# Patient Record
Sex: FEMALE | Race: WHITE | Employment: OTHER | URBAN - METROPOLITAN AREA
[De-identification: names, ages, dates, MRNs, and addresses within clinical notes are randomized per-mention and may not be internally consistent; named-entity substitution may affect disease eponyms.]

---

## 2020-02-17 ENCOUNTER — NURSE TRIAGE (OUTPATIENT)
Dept: NURSING | Facility: CLINIC | Age: 70
End: 2020-02-17

## 2020-02-17 NOTE — TELEPHONE ENCOUNTER
"Daughter calling; patient present.  [facility] patient was prescribed nitrofurantoin by a doctor in [place] for a bladder infection.  She has taken it for a couple of days and she is complaining of kidney pain and chills.  [facility] patient doesn't have a PCP and asking if she should take patient to ED.  FNA advised patient should be seen and could contact provider who prescribed nitrofurantoin or UC would be an option.  Daughter states they are currently in [place] and \"they are kind of backward here\" so will drive to [place].                  "

## 2020-04-14 ENCOUNTER — TELEPHONE (OUTPATIENT)
Dept: SURGERY | Facility: CLINIC | Age: 70
End: 2020-04-14

## 2020-04-14 NOTE — TELEPHONE ENCOUNTER
Name of caller: Daughter Brittney     Reason for Call:  Patient was at ER 2 months ago had a CT scan they told her she was full of BM. She wants to know what else they can do, she's been drinking prune juice. She didn't want to call her primary Dr because they don't know what they are doing there.     Surgeon:  Dr. Sethi    Recent Surgery:  No, 2013    If yes, when & what type:  INCISIONAL HERNIA REPAIR WITH MESH, POSSIBLE LAPAROSOCPY   GENERAL   PT INST TO HAVE H&P WITH DR. VARGAS   11/12/13       Best phone number to reach pt at is: [phone]  Ok to leave a message with medical info? Yes.    Pharmacy preferred (if calling for a refill): N/A

## 2020-04-15 ENCOUNTER — HOSPITAL ENCOUNTER (EMERGENCY)
Facility: CLINIC | Age: 70
Discharge: HOME | End: 2020-04-15
Attending: EMERGENCY MEDICINE | Admitting: EMERGENCY MEDICINE
Payer: MEDICARE

## 2020-04-15 ENCOUNTER — APPOINTMENT (OUTPATIENT)
Dept: CT IMAGING | Facility: CLINIC | Age: 70
End: 2020-04-15
Attending: EMERGENCY MEDICINE
Payer: MEDICARE

## 2020-04-15 VITALS
HEART RATE: 77 BPM | BODY MASS INDEX: 29.26 KG/M2 | RESPIRATION RATE: 18 BRPM | TEMPERATURE: 98.4 F | SYSTOLIC BLOOD PRESSURE: 146 MMHG | DIASTOLIC BLOOD PRESSURE: 78 MMHG | WEIGHT: 160 LBS | OXYGEN SATURATION: 100 %

## 2020-04-15 DIAGNOSIS — R19.4 CHANGE IN STOOL HABITS: ICD-10-CM

## 2020-04-15 DIAGNOSIS — R10.9 NONSPECIFIC ABDOMINAL PAIN: ICD-10-CM

## 2020-04-15 LAB
ALBUMIN SERPL-MCNC: 3.5 G/DL (ref 3.4–5)
ALBUMIN UR-MCNC: NEGATIVE MG/DL
ALP SERPL-CCNC: 127 U/L (ref 40–150)
ALT SERPL W P-5'-P-CCNC: 26 U/L (ref 0–50)
ANION GAP SERPL CALCULATED.3IONS-SCNC: 7 MMOL/L (ref 3–14)
APPEARANCE UR: CLEAR
AST SERPL W P-5'-P-CCNC: 23 U/L (ref 0–45)
BASOPHILS # BLD AUTO: 0 10E9/L (ref 0–0.2)
BASOPHILS NFR BLD AUTO: 0.4 %
BILIRUB SERPL-MCNC: 0.4 MG/DL (ref 0.2–1.3)
BILIRUB UR QL STRIP: NEGATIVE
BUN SERPL-MCNC: 10 MG/DL (ref 7–30)
CALCIUM SERPL-MCNC: 8.8 MG/DL (ref 8.5–10.1)
CHLORIDE SERPL-SCNC: 106 MMOL/L (ref 94–109)
CO2 SERPL-SCNC: 25 MMOL/L (ref 20–32)
COLOR UR AUTO: YELLOW
CREAT BLD-MCNC: 0.9 MG/DL (ref 0.52–1.04)
CREAT SERPL-MCNC: 0.85 MG/DL (ref 0.52–1.04)
DIFFERENTIAL METHOD BLD: NORMAL
EOSINOPHIL # BLD AUTO: 0.1 10E9/L (ref 0–0.7)
EOSINOPHIL NFR BLD AUTO: 1 %
ERYTHROCYTE [DISTWIDTH] IN BLOOD BY AUTOMATED COUNT: 14 % (ref 10–15)
GFR SERPL CREATININE-BSD FRML MDRD: 62 ML/MIN/{1.73_M2}
GFR SERPL CREATININE-BSD FRML MDRD: 70 ML/MIN/{1.73_M2}
GLUCOSE SERPL-MCNC: 102 MG/DL (ref 70–99)
GLUCOSE UR STRIP-MCNC: NEGATIVE MG/DL
HCT VFR BLD AUTO: 44.9 % (ref 35–47)
HGB BLD-MCNC: 14.5 G/DL (ref 11.7–15.7)
HGB UR QL STRIP: ABNORMAL
IMM GRANULOCYTES # BLD: 0.1 10E9/L (ref 0–0.4)
IMM GRANULOCYTES NFR BLD: 0.6 %
KETONES UR STRIP-MCNC: NEGATIVE MG/DL
LACTATE BLD-SCNC: 1.7 MMOL/L (ref 0.7–2)
LEUKOCYTE ESTERASE UR QL STRIP: NEGATIVE
LIPASE SERPL-CCNC: 94 U/L (ref 73–393)
LYMPHOCYTES # BLD AUTO: 3.1 10E9/L (ref 0.8–5.3)
LYMPHOCYTES NFR BLD AUTO: 29.6 %
MCH RBC QN AUTO: 30.5 PG (ref 26.5–33)
MCHC RBC AUTO-ENTMCNC: 32.3 G/DL (ref 31.5–36.5)
MCV RBC AUTO: 94 FL (ref 78–100)
MONOCYTES # BLD AUTO: 0.6 10E9/L (ref 0–1.3)
MONOCYTES NFR BLD AUTO: 6.2 %
MUCOUS THREADS #/AREA URNS LPF: PRESENT /LPF
NEUTROPHILS # BLD AUTO: 6.4 10E9/L (ref 1.6–8.3)
NEUTROPHILS NFR BLD AUTO: 62.2 %
NITRATE UR QL: NEGATIVE
NRBC # BLD AUTO: 0 10*3/UL
NRBC BLD AUTO-RTO: 0 /100
PH UR STRIP: 6 PH (ref 5–7)
PLATELET # BLD AUTO: 228 10E9/L (ref 150–450)
POTASSIUM SERPL-SCNC: 4 MMOL/L (ref 3.4–5.3)
PROT SERPL-MCNC: 7.8 G/DL (ref 6.8–8.8)
RBC # BLD AUTO: 4.76 10E12/L (ref 3.8–5.2)
RBC #/AREA URNS AUTO: 9 /HPF (ref 0–2)
SODIUM SERPL-SCNC: 138 MMOL/L (ref 133–144)
SOURCE: ABNORMAL
SP GR UR STRIP: 1.02 (ref 1–1.03)
SQUAMOUS #/AREA URNS AUTO: 1 /HPF (ref 0–1)
TROPONIN I SERPL-MCNC: <0.015 UG/L (ref 0–0.04)
UROBILINOGEN UR STRIP-MCNC: NORMAL MG/DL (ref 0–2)
WBC # BLD AUTO: 10.3 10E9/L (ref 4–11)
WBC #/AREA URNS AUTO: 2 /HPF (ref 0–5)

## 2020-04-15 PROCEDURE — 25000132 ZZH RX MED GY IP 250 OP 250 PS 637: Mod: GY | Performed by: EMERGENCY MEDICINE

## 2020-04-15 PROCEDURE — 85025 COMPLETE CBC W/AUTO DIFF WBC: CPT | Performed by: EMERGENCY MEDICINE

## 2020-04-15 PROCEDURE — 82565 ASSAY OF CREATININE: CPT

## 2020-04-15 PROCEDURE — 96374 THER/PROPH/DIAG INJ IV PUSH: CPT | Mod: 59

## 2020-04-15 PROCEDURE — 83690 ASSAY OF LIPASE: CPT | Performed by: EMERGENCY MEDICINE

## 2020-04-15 PROCEDURE — 96376 TX/PRO/DX INJ SAME DRUG ADON: CPT

## 2020-04-15 PROCEDURE — 25800030 ZZH RX IP 258 OP 636: Performed by: EMERGENCY MEDICINE

## 2020-04-15 PROCEDURE — 84484 ASSAY OF TROPONIN QUANT: CPT | Performed by: EMERGENCY MEDICINE

## 2020-04-15 PROCEDURE — 81001 URINALYSIS AUTO W/SCOPE: CPT | Performed by: EMERGENCY MEDICINE

## 2020-04-15 PROCEDURE — 96361 HYDRATE IV INFUSION ADD-ON: CPT

## 2020-04-15 PROCEDURE — 96375 TX/PRO/DX INJ NEW DRUG ADDON: CPT

## 2020-04-15 PROCEDURE — 74177 CT ABD & PELVIS W/CONTRAST: CPT

## 2020-04-15 PROCEDURE — 99285 EMERGENCY DEPT VISIT HI MDM: CPT | Mod: 25

## 2020-04-15 PROCEDURE — 25000128 H RX IP 250 OP 636: Performed by: EMERGENCY MEDICINE

## 2020-04-15 PROCEDURE — 25000125 ZZHC RX 250: Performed by: EMERGENCY MEDICINE

## 2020-04-15 PROCEDURE — 83605 ASSAY OF LACTIC ACID: CPT | Performed by: EMERGENCY MEDICINE

## 2020-04-15 PROCEDURE — 80053 COMPREHEN METABOLIC PANEL: CPT | Performed by: EMERGENCY MEDICINE

## 2020-04-15 PROCEDURE — 93005 ELECTROCARDIOGRAM TRACING: CPT

## 2020-04-15 RX ORDER — MORPHINE SULFATE 4 MG/ML
4 INJECTION, SOLUTION INTRAMUSCULAR; INTRAVENOUS
Status: DISCONTINUED | OUTPATIENT
Start: 2020-04-15 | End: 2020-04-15 | Stop reason: HOSPADM

## 2020-04-15 RX ORDER — IOPAMIDOL 755 MG/ML
81 INJECTION, SOLUTION INTRAVASCULAR ONCE
Status: COMPLETED | OUTPATIENT
Start: 2020-04-15 | End: 2020-04-15

## 2020-04-15 RX ORDER — TIZANIDINE HYDROCHLORIDE 2 MG/1
2 CAPSULE, GELATIN COATED ORAL 3 TIMES DAILY
COMMUNITY
End: 2020-11-23

## 2020-04-15 RX ORDER — ONDANSETRON 4 MG/1
4 TABLET, ORALLY DISINTEGRATING ORAL EVERY 8 HOURS PRN
Qty: 10 TABLET | Refills: 0 | Status: SHIPPED | OUTPATIENT
Start: 2020-04-15 | End: 2020-04-18

## 2020-04-15 RX ORDER — HYDROCODONE BITARTRATE AND ACETAMINOPHEN 5; 325 MG/1; MG/1
1 TABLET ORAL EVERY 6 HOURS PRN
Qty: 10 TABLET | Refills: 0 | Status: SHIPPED | OUTPATIENT
Start: 2020-04-15 | End: 2020-04-18

## 2020-04-15 RX ORDER — ONDANSETRON 2 MG/ML
4 INJECTION INTRAMUSCULAR; INTRAVENOUS
Status: COMPLETED | OUTPATIENT
Start: 2020-04-15 | End: 2020-04-15

## 2020-04-15 RX ORDER — FAMOTIDINE 20 MG/1
20 TABLET, FILM COATED ORAL ONCE
Status: COMPLETED | OUTPATIENT
Start: 2020-04-15 | End: 2020-04-15

## 2020-04-15 RX ORDER — MORPHINE SULFATE 4 MG/ML
4 INJECTION, SOLUTION INTRAMUSCULAR; INTRAVENOUS ONCE
Status: COMPLETED | OUTPATIENT
Start: 2020-04-15 | End: 2020-04-15

## 2020-04-15 RX ORDER — ACETAMINOPHEN 500 MG
1000 TABLET ORAL ONCE
Status: DISCONTINUED | OUTPATIENT
Start: 2020-04-15 | End: 2020-04-15

## 2020-04-15 RX ORDER — FAMOTIDINE 20 MG/1
20 TABLET, FILM COATED ORAL 2 TIMES DAILY
Qty: 30 TABLET | Refills: 0 | Status: SHIPPED | OUTPATIENT
Start: 2020-04-15 | End: 2020-11-23

## 2020-04-15 RX ADMIN — ONDANSETRON 4 MG: 2 INJECTION INTRAMUSCULAR; INTRAVENOUS at 16:21

## 2020-04-15 RX ADMIN — IOPAMIDOL 81 ML: 755 INJECTION, SOLUTION INTRAVENOUS at 16:33

## 2020-04-15 RX ADMIN — LIDOCAINE HYDROCHLORIDE 30 ML: 20 SOLUTION ORAL; TOPICAL at 17:27

## 2020-04-15 RX ADMIN — MORPHINE SULFATE 4 MG: 4 INJECTION INTRAVENOUS at 16:22

## 2020-04-15 RX ADMIN — MORPHINE SULFATE 4 MG: 4 INJECTION INTRAVENOUS at 17:07

## 2020-04-15 RX ADMIN — SODIUM CHLORIDE 500 ML: 9 INJECTION, SOLUTION INTRAVENOUS at 16:22

## 2020-04-15 RX ADMIN — FAMOTIDINE 20 MG: 20 TABLET ORAL at 17:29

## 2020-04-15 ASSESSMENT — ENCOUNTER SYMPTOMS
DYSURIA: 0
DIARRHEA: 1
VOMITING: 1
ABDOMINAL PAIN: 1
COUGH: 0
CHILLS: 1
SORE THROAT: 0
FEVER: 0
DIFFICULTY URINATING: 0
NAUSEA: 1
APPETITE CHANGE: 1
BLOOD IN STOOL: 0

## 2020-04-15 NOTE — ED TRIAGE NOTES
Pt here with generalized abd and bilateral flank pain x1 wk. No cough, fever, diarrhea, SOB. Emesis x1 yesterday. No dysuria. ABC intact. A&Ox4.

## 2020-04-15 NOTE — TELEPHONE ENCOUNTER
Called patient this morning to advised that she call her PCP.  She states that she spoke with PCP this morning and she is scheduled for a CT scan today.

## 2020-04-15 NOTE — ED PROVIDER NOTES
"  History     Chief Complaint:  Abdominal Pain      HPI   Paula Simms is a 70 year old female who presents with diffuse abdominal and flank pain. Per the patient, last month she presented to an out of town emergency room for abdominal pain, and she was told she was \"full of stool\" despite no report of constipation. She was told to get prune juice and her pain seemed to clear up a bit. Seven days ago, she developed similar abdominal pain which is all over her abdomen and radiates to her flanks. In the morning she has been having formed stools without blood but it develops into watery diarrhea later in the day since starting prune juice again. She also developed nausea yesterday and vomited once without blood. She can drink fluids but feels bloated afterwards and has some appetite loss. Eating exacerbates her symptoms. She also endorses chills at times but no fever. Yesterday, she called Dr. Sethi of surgery for previously repaired hernias but a nurse told her that those are likely not related. She notes the pain persisted so she came to the ED. She denies fever, cough, congestion, sore throat, difficulty urinating, dysuria, sore throat, leg pain or swelling,and alcohol use.    Allergies:  Levofloxacin  Sulfa Drugs    Medications:    Tizanidine    Past Medical History:    CAD  Diverticulitis  GERD  Heart attack  Heart disease  Stented coronary artery  SBO  Incisional hernia  Hypertension  Arthritis  Hyperlipidemia    Past Surgical History:    Appendectomy  Cholecystectomy  Esophageal stretching x2  Herniorrhaphy incisional  Colectomy left (descending)  Resect small bowel without ostomy  Breast augmentation  Removal of breast augmentation    Family History:    Father: CAD  Mother: Uterine cancer, thyroid disorder  Sister: Thyroid disorder    Social History:  Smoking Status: Current Every Day  Smokeless Tobacco: Never Used  Alcohol Use: Negative  Drug Use: Negative  PCP: Sabino Miles  Marital Status:  "     Review of Systems   Constitutional: Positive for appetite change and chills. Negative for fever.   HENT: Negative for congestion and sore throat.    Respiratory: Negative for cough.    Cardiovascular: Negative for leg swelling.   Gastrointestinal: Positive for abdominal pain, diarrhea, nausea and vomiting. Negative for blood in stool.   Genitourinary: Negative for difficulty urinating and dysuria.   All other systems reviewed and are negative.      Physical Exam     Patient Vitals for the past 24 hrs:   BP Temp Temp src Pulse Heart Rate Resp SpO2 Weight   04/15/20 1700 126/68 -- -- 76 -- -- 99 % --   04/15/20 1615 (!) 143/80 -- -- 79 -- -- 99 % --   04/15/20 1600 (!) 141/78 -- -- 79 -- -- 97 % --   04/15/20 1525 (!) 156/83 98.4  F (36.9  C) Oral -- 87 16 98 % 72.6 kg (160 lb)       Physical Exam  General: Elderly female sitting upright  Eyes: PERRL, Conjunctive within normal limits. No scleral icterus.  ENT: Moist mucous membranes, oropharynx clear.   CV: Normal S1S2, no murmur, rub or gallop. Regular rate and rhythm  Resp: Clear to auscultation bilaterally, no wheezes, rales or rhonchi. Normal respiratory effort.  GI: Abdomen is soft and mildly protuberant. Tender diffusely in the lower quadrants and tender in the epigastrium. No palpable masses. No rebound or guarding. Midline scar noted.  MSK: Trace edema. Nontender. Normal active range of motion.  Skin: Warm and dry. No rashes or lesions or ecchymoses on visible skin.  Neuro: Alert and oriented. Responds appropriately to all questions and commands. No focal findings appreciated. Normal muscle tone.  Psych: Normal mood and affect. Pleasant.      Emergency Department Course     ECG:  ECG taken at 1735, ECG read at 1737  Sinus rhythm with premature atrial complexes  Otherwise normal ECG  Rate 87 bpm. DC interval 164 ms. QRS duration 72 ms. QT/QTc 386/464 ms. P-R-T axes 72 23 67.    Imaging:  Radiology findings were communicated with the patient who  voiced understanding of the findings.    CT Abdomen Pelvis w Contrast  1.  No acute changes noted in the abdomen or pelvis to account for  patient's symptoms. No bowel obstruction or diverticulitis. Apparent  prior appendectomy changes.  2.  Questionable mild gastric antral wall thickening without  surrounding inflammation. Gastritis is possible. Correlate with  patient's symptoms.  3.  Mildly prominent yony hepatis lymph nodes without surrounding  inflammation. Pancreas is unremarkable. These are indeterminate.  Reading per radiology.    Laboratory:  Laboratory findings were communicated with the patient who voiced understanding of the findings.    CBC: WBC 10.3, HGB 14.5,   BMP: Glucose 102(H) o/w WNL (Creatinine 0.85)  Creatinine POCT: Creatinine 0.9, GFR estimate 62  Lactic Acid (Resulted 1634): 1.7  Lipase: 94  Troponin (Collected 1609): <0.015    UA with micro: Blood Trace(A), RBC 9(H), Mucous Present(A) o/w WNL    Interventions:  1621: Zofran, 4 mg, IV  1622: Normal Saline, 1 liter, IV bolus   1622: Morphine, 4 mg, IV  1707: Morphine, 4 mg, IV  1727: GI Cocktail, 30 mL, Oral  1729: Pepcid, 20 mg, Oral    Emergency Department Course:    1531 Nursing notes and vitals reviewed.    1535 I performed an exam of the patient as documented above.     1558 The patient provided a urine sample here in the emergency department. This was sent for laboratory testing, findings above.    1611 IV was inserted and blood was drawn for laboratory testing, results above.    1630 The patient was sent for a CT while in the emergency department, results above.     1726 Patient rechecked and updated. She notes ongoing pain, pointing to the epigastrium, and thinks ice chips would help.    1735 EKG obtained in the ED, see results above.     Patient reassessed. She notes pain has improved and is tolerable at this time. She feels comfortable going home.     1840 Findings and plan explained to the Patient. patient discharged home  with instructions regarding supportive care, medications, and reasons to return. The importance of close follow-up was reviewed. The patient was prescribed Pepcid, Norco, and Zofran.    Impression & Plan      Medical Decision Making:  This patient presents with abdominal pain.  The differential diagnosis is broad and includes:  Appendicitis, cholecystitis, peptic ulcer disease, diverticulitis, bowel obstruction, ischemia, pancreatitis, amongst others.  Based on clinical exam, laboratory testing, and imaging, no acute life threatening or emergent surgical pathologies are present. There is possible thickening of the stomach suggesting gastritis and given improvement with GI cocktail, PUD or gastritis may be contributing to her symptoms.  The exact etiology of the pain is not clear at this time.  The patient will be discharged, and was warned that persistent or worsening symptoms should prompt re-examination by a physician (ED if necessary) in 8-12 hours. She is recommended to see her PCP and/or GI with consideration of outpatient endoscopy/colonscopy.     Diagnosis:    ICD-10-CM    1. Nonspecific abdominal pain  R10.9    2. Change in stool habits  R19.4      Disposition:   Patient was discharged home.    Discharge Medications:  New Prescriptions    FAMOTIDINE (PEPCID) 20 MG TABLET    Take 1 tablet (20 mg) by mouth 2 times daily    HYDROCODONE-ACETAMINOPHEN (NORCO) 5-325 MG TABLET    Take 1 tablet by mouth every 6 hours as needed for severe pain    ONDANSETRON (ZOFRAN ODT) 4 MG ODT TAB    Take 1 tablet (4 mg) by mouth every 8 hours as needed for nausea or vomiting       Scribe Disclosure:  Eduardo BARRERA, am serving as a scribe at 3:33 PM on 4/15/2020 to document services personally performed by Lesli Conroy MD based on my observations and the provider's statements to me.    [facility] EMERGENCY DEPARTMENT       Lesli Conroy MD  04/15/20 0463

## 2020-04-15 NOTE — ED AVS SNAPSHOT
[facility] Emergency Department  [address]  [facility] [address]  Phone:  [phone]  Fax:  [phone]                                    Paula Simms   MRN: [id number]    Department:  [facility] Emergency Department   Date of Visit:  4/15/2020           After Visit Summary Signature Page    I have received my discharge instructions, and my questions have been answered. I have discussed any challenges I see with this plan with the nurse or doctor.    ..........................................................................................................................................  Patient/Patient Representative Signature      ..........................................................................................................................................  Patient Representative Print Name and Relationship to Patient    ..................................................               ................................................  Date                                   Time    ..........................................................................................................................................  Reviewed by Signature/Title    ...................................................              ..............................................  Date                                               Time          22EPIC Rev 08/18

## 2020-04-16 LAB — INTERPRETATION ECG - MUSE: NORMAL

## 2020-11-01 ENCOUNTER — NURSE TRIAGE (OUTPATIENT)
Dept: NURSING | Facility: CLINIC | Age: 70
End: 2020-11-01

## 2020-11-02 NOTE — TELEPHONE ENCOUNTER
Triage Call:   Pts daughter calling about pt not having a bowel movement in 4 days and now the pt is having constant abdominal pain lasting more than 2 hours. Pt has tried Ex-lax and daughter wants to know if she could have an enema. Pt was advised to go to the hospital, daughter stated if they go it would be to the [facility].     COVID 19 Nurse Triage Plan/Patient Instructions    Please be aware that novel coronavirus (COVID-19) may be circulating in the community. If you develop symptoms such as fever, cough, or SOB or if you have concerns about the presence of another infection including coronavirus (COVID-19), please contact your health care provider or visit [email or web address].     Disposition/Instructions    ED Visit recommended. Follow protocol based instructions.     Bring Your Own Device:  Please also bring your smart device(s) (smart phones, tablets, laptops) and their charging cables for your personal use and to communicate with your care team during your visit.    Thank you for taking steps to prevent the spread of this virus.  o Limit your contact with others.  o Wear a simple mask to cover your cough.  o Wash your hands well and often.    Resources    [facility]: About COVID-19: [email or web address]    CDC: What to Do If You're Sick: www.cdc.gov/coronavirus/2019-ncov/about/steps-when-sick.html    CDC: Ending Home Isolation: www.cdc.gov/coronavirus/2019-ncov/hcp/disposition-in-home-patients.html     CDC: Caring for Someone: www.cdc.gov/coronavirus/2019-ncov/if-you-are-sick/care-for-someone.html     [facility]: Interim Guidance for Hospital Discharge to Home: www.health.[facility].[place]/diseases/coronavirus/hcp/hospdischarge.pdf    [facility] clinical trials (COVID-19 research studies): clinicalaffairs.[facility]/umn-clinical-trials     Below are the COVID-19 hotlines at the [place] Department of Health ([facility]). Interpreters are available.   o For health questions: Call  805.203.1231 or 1-363.515.2491 (7 a.m. to 7 p.m.)  o For questions about schools and childcare: Call 192-691-9876 or 1-472.349.1498 (7 a.m. to 7 p.m.)       Corie Goel RN Nursing Advisor 11/1/2020 9:05 PM       Additional Information    Negative: Patient sounds very sick or weak to the triager    Negative: [1] Vomiting AND [2] abdomen looks much more swollen than usual    Negative: [1] Vomiting AND [2] contains bile (green color)    [1] Constant abdominal pain AND [2] present > 2 hours    Protocols used: CONSTIPATION-A-AH

## 2020-11-23 ENCOUNTER — OFFICE VISIT (OUTPATIENT)
Dept: SURGERY | Facility: CLINIC | Age: 70
End: 2020-11-23
Payer: MEDICARE

## 2020-11-23 VITALS
SYSTOLIC BLOOD PRESSURE: 128 MMHG | HEIGHT: 62 IN | DIASTOLIC BLOOD PRESSURE: 88 MMHG | WEIGHT: 163 LBS | HEART RATE: 89 BPM | BODY MASS INDEX: 30 KG/M2 | RESPIRATION RATE: 18 BRPM | OXYGEN SATURATION: 97 %

## 2020-11-23 DIAGNOSIS — K57.32 DIVERTICULITIS OF COLON: Primary | ICD-10-CM

## 2020-11-23 PROCEDURE — 99203 OFFICE O/P NEW LOW 30 MIN: CPT | Performed by: SURGERY

## 2020-11-23 RX ORDER — HYDROCODONE BITARTRATE AND ACETAMINOPHEN 5; 325 MG/1; MG/1
TABLET ORAL
COMMUNITY
Start: 2020-11-09 | End: 2023-07-18

## 2020-11-23 ASSESSMENT — MIFFLIN-ST. JEOR: SCORE: 1212.61

## 2020-11-23 NOTE — PROGRESS NOTES
"This is a patient known to me from a previous sigmoid colectomy for diverticulitis and subsequent incisional hernia repair.  She has been experiencing fairly chronic abdominal pain and difficulty with bowel movements.  About 2-1/2 weeks ago, she was seen at her local hospital in [facility].  She was diagnosed with diverticulitis and started on Augmentin.  She does not feel she had much improvement from that.  She has had a somewhat decreased appetite.  She saw Dr. Daley in her hometown on the 19th and was restarted on Augmentin.  She does not feel she has had any significant improvement since then.  The patient notes lower abdominal pain on both sides.  It feels like something is \"falling out\" down below.  She reports fairly frequent diarrhea.  The patient's imaging from [place] is not available to me.  I do not have those reports.  She was reported to have some bowel thickening and this was felt to be consistent with diverticulitis.    Past Medical History:   Diagnosis Date     Chronic infection     ESBL resistance     Coronary artery disease      Diverticulitis      Gastro-oesophageal reflux disease      Heart attack (H)     1999     Heart disease      Pneumonia     and bronchitis within last year     Stented coronary artery     1999     Past Surgical History:   Procedure Laterality Date     APPENDECTOMY       CHOLECYSTECTOMY       esophageal stretching[      x 2     HERNIORRHAPHY INCISIONAL (LOCATION)  11/12/2013    Procedure: HERNIORRHAPHY INCISIONAL (LOCATION);  Incisional Hernia Repair with Mesh;  Surgeon: Denys Sethi MD;  Location:  OR     LAPAROSCOPIC ASSISTED COLECTOMY LEFT (DESCENDING)  3/19/2013    Procedure: LAPAROSCOPIC ASSISTED COLECTOMY LEFT (DESCENDING);  Laparoscopic Hand Assisted Sigmoid colectomy, Possible Small Bowel Resection ;  Surgeon: Denys Sethi MD;  Location: [facility] OR     RESECT SMALL BOWEL WITHOUT OSTOMY  3/19/2013    Procedure: RESECT SMALL BOWEL WITHOUT " OSTOMY;;  Surgeon: Denys Sethi MD;  Location: [facility] OR     Review of systems: The patient does notice some chills, she denies fevers.  She denies current constipation.  She denies any change in her urine.  Specifically, she denies air in her urine.      Physical exam:  The patient appears somewhat uncomfortable.  She moves fairly slowly onto the examination table.  Breathing is nonlabored.  The abdomen reveals fairly diffuse tenderness across the lower abdomen.  This is not markedly different left or right.  There is no obvious palpable mass or fullness.  Skin shows no evidence of jaundice.  Psychiatric-the patient seems to have fairly good insight into her situation.    Assessment and plan: This is a patient with lower abdominal pain who was diagnosed with diverticulitis about 2-1/2 weeks ago in [facility].  Those results are not available to me personally to review.  Given the patient's ongoing pain, I think a follow-up CT scan would be reasonable.  Her primary doctor was considering doing this in any case, but they ran into some scheduling issues.  Given the fact that the patient has had a previous sigmoid colectomy, if it did come to further surgical intervention for diverticulitis or other left-sided colon issue, I think this might be most appropriately done by a colorectal surgeon.  I did offer to schedule the CT scan for the patient here, but she does live near [facility].  I have therefore suggested that she arrange to have a follow-up CT scan and then follow-up with the colon and rectal surgeon.  I will defer that to her primary care office.    Denys Sethi MD  [facility]    Please route or send letter to:  Dr. Paul Daley at [address] [place] [address]  [phone]

## 2020-11-24 ENCOUNTER — TRANSFERRED RECORDS (OUTPATIENT)
Dept: SURGERY | Facility: CLINIC | Age: 70
End: 2020-11-24

## 2020-11-24 ENCOUNTER — TRANSFERRED RECORDS (OUTPATIENT)
Dept: HEALTH INFORMATION MANAGEMENT | Facility: CLINIC | Age: 70
End: 2020-11-24

## 2020-11-24 LAB
CREAT SERPL-MCNC: 0.81 MG/DL (ref 0.59–1.04)
GFR SERPL CREATININE-BSD FRML MDRD: 74 ML/MIN/BSA

## 2020-11-27 ENCOUNTER — TELEPHONE (OUTPATIENT)
Dept: SURGERY | Facility: CLINIC | Age: 70
End: 2020-11-27

## 2020-11-27 NOTE — TELEPHONE ENCOUNTER
This is a previous patient of  who had a sigmoid colectomy and incisional hernia by him some years ago.    Pt was recently seen in [place], [place] ER and follow up with Dr. Paul Daley.      She was recently seen in our clinic on 11/23/20 for a consult re: diverticulitis by Dr. Sethi.     Today she is calling because she continues to have low abdominal pain and loose stools and will run out of her Augmentin that was prescribed by her PCP, .    Wondering if  will refill abx.      Based on 11/23/20 note,  recommended a CT scan and to follow up with colon and rectal surgery - this was to be arranged by her PCP.    Letter sent to Dr. Paul Daley.     Advised patient to call her Primary care clinic to evaluate ongoing pain and if refill of abx is needed.      Patient verbalizes understanding and agrees with plan.

## 2022-09-01 ENCOUNTER — OFFICE VISIT (OUTPATIENT)
Dept: SURGERY | Facility: CLINIC | Age: 72
End: 2022-09-01
Payer: MEDICARE

## 2022-09-01 VITALS
OXYGEN SATURATION: 95 % | SYSTOLIC BLOOD PRESSURE: 138 MMHG | WEIGHT: 165 LBS | HEIGHT: 62 IN | RESPIRATION RATE: 16 BRPM | HEART RATE: 65 BPM | BODY MASS INDEX: 30.36 KG/M2 | DIASTOLIC BLOOD PRESSURE: 78 MMHG

## 2022-09-01 DIAGNOSIS — R10.84 ABDOMINAL PAIN, GENERALIZED: Primary | ICD-10-CM

## 2022-09-01 PROCEDURE — 99214 OFFICE O/P EST MOD 30 MIN: CPT | Performed by: SURGERY

## 2022-09-01 NOTE — PROGRESS NOTES
The patient presents today with pain along the left lateral edge of her rectus muscle.  She has noticed this over the last 4 months or so.  The patient has gained about 10 to 20 pounds in recent months.  She has been trying to lose weight, but has not been successful.  She did have a recent CT scan done at an outside institution.  This specifically commented that there was no evidence of recurrent hernia.      Past Medical History:   Diagnosis Date     Chronic infection     ESBL resistance     Coronary artery disease      Diverticulitis      Gastro-oesophageal reflux disease      Heart attack (H)     1999     Heart disease      Pneumonia     and bronchitis within last year     Stented coronary artery     1999     Past Surgical History:   Procedure Laterality Date     APPENDECTOMY       CHOLECYSTECTOMY       esophageal stretching[      x 2     HERNIORRHAPHY INCISIONAL (LOCATION)  11/12/2013    Procedure: HERNIORRHAPHY INCISIONAL (LOCATION);  Incisional Hernia Repair with Mesh;  Surgeon: Denys Sethi MD;  Location: [facility] OR     LAPAROSCOPIC ASSISTED COLECTOMY LEFT (DESCENDING)  3/19/2013    Procedure: LAPAROSCOPIC ASSISTED COLECTOMY LEFT (DESCENDING);  Laparoscopic Hand Assisted Sigmoid colectomy, Possible Small Bowel Resection ;  Surgeon: Denys Sethi MD;  Location: [facility] OR     RESECT SMALL BOWEL WITHOUT OSTOMY  3/19/2013    Procedure: RESECT SMALL BOWEL WITHOUT OSTOMY;;  Surgeon: Denys Sethi MD;  Location: [facility] OR     Physical exam:  The patient is in no apparent distress.  Breathing is nonlabored.  The abdomen is soft with tenderness along the lateral edge of the left rectus muscle.  There is no palpable mass or bulge.    Assessment and plan: This is a patient with tenderness along the expected area of the lateral border of the mesh on the left.  This could easily be related to her recent weight gain, as this would put the mesh under some tension.  I explained that if she would lose some weight or  stabilize her weight gain, this would likely improve with time.  There is certainly no obvious surgery to fix this.  The patient is welcome to come back and see me if she has not had any improvement in several months, but I am not sure I have anything to offer surgically.    A total of 30 minutes was spent today in chart review, patient examination and discussion, and documentation.    Denys Sethi MD  [facility]    Please route or send letter to:  Dr. Paul Daley, in the Department of family medicine in [place], [place].  ([facility])

## 2022-09-01 NOTE — LETTER
2022    RE: Paula Simms, : 1950      The patient presents today with pain along the left lateral edge of her rectus muscle.  She has noticed this over the last 4 months or so.  The patient has gained about 10 to 20 pounds in recent months.  She has been trying to lose weight, but has not been successful.  She did have a recent CT scan done at an outside institution.  This specifically commented that there was no evidence of recurrent hernia.      Physical exam:  The patient is in no apparent distress.  Breathing is nonlabored.  The abdomen is soft with tenderness along the lateral edge of the left rectus muscle.  There is no palpable mass or bulge.     Assessment and plan: This is a patient with tenderness along the expected area of the lateral border of the mesh on the left.  This could easily be related to her recent weight gain, as this would put the mesh under some tension.  I explained that if she would lose some weight or stabilize her weight gain, this would likely improve with time.  There is certainly no obvious surgery to fix this.  The patient is welcome to come back and see me if she has not had any improvement in several months, but I am not sure I have anything to offer surgically.       Denys Sethi MD  [facility]

## 2023-07-18 ENCOUNTER — HOSPITAL ENCOUNTER (EMERGENCY)
Facility: CLINIC | Age: 73
Discharge: HOME | End: 2023-07-18
Payer: MEDICARE

## 2023-07-18 ENCOUNTER — APPOINTMENT (OUTPATIENT)
Dept: GENERAL RADIOLOGY | Facility: CLINIC | Age: 73
End: 2023-07-18
Payer: MEDICARE

## 2023-07-18 VITALS
SYSTOLIC BLOOD PRESSURE: 147 MMHG | HEART RATE: 88 BPM | RESPIRATION RATE: 20 BRPM | OXYGEN SATURATION: 96 % | DIASTOLIC BLOOD PRESSURE: 52 MMHG | TEMPERATURE: 99.5 F

## 2023-07-18 DIAGNOSIS — S32.030A COMPRESSION FRACTURE OF L3 VERTEBRA, INITIAL ENCOUNTER (H): ICD-10-CM

## 2023-07-18 DIAGNOSIS — S32.010A COMPRESSION FRACTURE OF L1 VERTEBRA, INITIAL ENCOUNTER (H): ICD-10-CM

## 2023-07-18 LAB
ALBUMIN UR-MCNC: NEGATIVE MG/DL
APPEARANCE UR: ABNORMAL
BACTERIA #/AREA URNS HPF: ABNORMAL /HPF
BILIRUB UR QL STRIP: NEGATIVE
COLOR UR AUTO: YELLOW
GLUCOSE UR STRIP-MCNC: NEGATIVE MG/DL
HGB UR QL STRIP: ABNORMAL
KETONES UR STRIP-MCNC: NEGATIVE MG/DL
LEUKOCYTE ESTERASE UR QL STRIP: NEGATIVE
MUCOUS THREADS #/AREA URNS LPF: PRESENT /LPF
NITRATE UR QL: NEGATIVE
PH UR STRIP: 5 [PH] (ref 5–7)
RBC URINE: 6 /HPF
SP GR UR STRIP: 1.01 (ref 1–1.03)
SQUAMOUS EPITHELIAL: 7 /HPF
UROBILINOGEN UR STRIP-MCNC: NORMAL MG/DL
WBC URINE: 6 /HPF

## 2023-07-18 PROCEDURE — 87086 URINE CULTURE/COLONY COUNT: CPT

## 2023-07-18 PROCEDURE — 72100 X-RAY EXAM L-S SPINE 2/3 VWS: CPT

## 2023-07-18 PROCEDURE — 250N000013 HC RX MED GY IP 250 OP 250 PS 637

## 2023-07-18 PROCEDURE — G0463 HOSPITAL OUTPT CLINIC VISIT: HCPCS

## 2023-07-18 PROCEDURE — 81001 URINALYSIS AUTO W/SCOPE: CPT

## 2023-07-18 PROCEDURE — 99203 OFFICE O/P NEW LOW 30 MIN: CPT

## 2023-07-18 RX ORDER — HYDROCODONE BITARTRATE AND ACETAMINOPHEN 5; 325 MG/1; MG/1
1 TABLET ORAL EVERY 6 HOURS PRN
Qty: 10 TABLET | Refills: 0 | Status: SHIPPED | OUTPATIENT
Start: 2023-07-18 | End: 2023-07-21

## 2023-07-18 RX ORDER — HYDROCODONE BITARTRATE AND ACETAMINOPHEN 5; 325 MG/1; MG/1
1 TABLET ORAL ONCE
Status: COMPLETED | OUTPATIENT
Start: 2023-07-18 | End: 2023-07-18

## 2023-07-18 RX ADMIN — HYDROCODONE BITARTRATE AND ACETAMINOPHEN 1 TABLET: 5; 325 TABLET ORAL at 16:51

## 2023-07-18 ASSESSMENT — ENCOUNTER SYMPTOMS
FLANK PAIN: 0
CHILLS: 1
BACK PAIN: 1
DYSURIA: 0
FREQUENCY: 0

## 2023-07-18 ASSESSMENT — ACTIVITIES OF DAILY LIVING (ADL): ADLS_ACUITY_SCORE: 35

## 2023-07-18 NOTE — ED TRIAGE NOTES
Pt reports hx of osteoporosis in her back. Pt states she has pain middle of back down to back and radiates to the right hip.   Pt states that she has tried hans back and body OTC. Pt states she has not been taking her calcium.   Pt denies any falls of injury.   Symptom onset 3 days ago.

## 2023-07-18 NOTE — ED PROVIDER NOTES
History     Chief Complaint   Patient presents with     Back Pain     HPI  Paula Simms is a 73 year old female who presents urgent care for concern of low back pain.  Patient states a 3-day history of right-sided low back pain.  Denies any significant injury.  States she does feel that she may have twisted from causing the onset of the pain.  Patient does have a history significant for osteoporosis and patient is very concerned as a result.  She reports that the pain radiates from her right-sided low back into the right hip and down into her leg.  States she has been able to ambulate but it is very difficult due to pain within the right leg.  Denies any loss of bowel or bladder control.  She does report feeling some chills the other night and is concerned she may have a UTI as well and is requesting testing for this.  Denies any flank specific pain.  She has been using aspirin at home without any significant relief in pain.  She has also tried tizanidine which she has at home which does offer her some relief for up to about an hour but then she states it wears off.  She otherwise denies any other significant complaints at this time.    Allergies:  Allergies   Allergen Reactions     Tramadol Headache     Other reaction(s): Other (see comments)  Shakey, mouth blisters, headache       Levofloxacin Hives and Itching     Sulfa Antibiotics Other (See Comments)     Mouth & lips broke out in blisters       Problem List:    Patient Active Problem List    Diagnosis Date Noted     Incisional hernia 11/12/2013     Priority: Medium     Diverticulitis of colon with perforation 03/19/2013     Priority: Medium     SBO (small bowel obstruction) (H) 01/24/2013     Priority: Medium        Past Medical History:    Past Medical History:   Diagnosis Date     Chronic infection      Coronary artery disease      Diverticulitis      Gastro-oesophageal reflux disease      Heart attack (H)      Heart disease      Pneumonia      Stented  coronary artery        Past Surgical History:    Past Surgical History:   Procedure Laterality Date     APPENDECTOMY       CHOLECYSTECTOMY       esophageal stretching[      x 2     HERNIORRHAPHY INCISIONAL (LOCATION)  11/12/2013    Procedure: HERNIORRHAPHY INCISIONAL (LOCATION);  Incisional Hernia Repair with Mesh;  Surgeon: Denys Sethi MD;  Location: [facility] OR     LAPAROSCOPIC ASSISTED COLECTOMY LEFT (DESCENDING)  3/19/2013    Procedure: LAPAROSCOPIC ASSISTED COLECTOMY LEFT (DESCENDING);  Laparoscopic Hand Assisted Sigmoid colectomy, Possible Small Bowel Resection ;  Surgeon: Denys Sethi MD;  Location: [facility] OR     RESECT SMALL BOWEL WITHOUT OSTOMY  3/19/2013    Procedure: RESECT SMALL BOWEL WITHOUT OSTOMY;;  Surgeon: Denys Sethi MD;  Location: [facility] OR       Family History:    Family History   Problem Relation Age of Onset     Cerebrovascular Disease Mother      Thyroid Disease Mother      C.A.D. Father      Heart Disease Father      Thyroid Disease Other        Social History:  Marital Status:   [4]  Social History     Tobacco Use     Smoking status: Every Day     Years: 46.00     Types: Cigarettes     Last attempt to quit: 12/26/2012     Years since quitting: 10.5     Smokeless tobacco: Never   Substance Use Topics     Alcohol use: No     Drug use: No        Medications:    ASPIRIN EC PO  OMEPRAZOLE PO  Calcium Carbonate-Vit D-Min (CALCIUM 1200 PO)  HYDROcodone-acetaminophen (NORCO) 5-325 MG tablet  Multiple Vitamins-Minerals (WOMENS DAILY FORMULA PO)  vitamin E 400 UNIT capsule          Review of Systems   Constitutional: Positive for chills.   Genitourinary: Negative for dysuria, flank pain and frequency.   Musculoskeletal: Positive for back pain. Negative for gait problem.   Skin: Negative for rash.       Physical Exam   BP: (!) 147/52  Pulse: 88  Temp: 99.5  F (37.5  C)  Resp: 20  SpO2: 96 %      Physical Exam  Constitutional:       General: She is not in acute distress.     Appearance:  Normal appearance. She is well-developed.   HENT:      Head: Normocephalic and atraumatic.   Eyes:      General: No scleral icterus.     Conjunctiva/sclera: Conjunctivae normal.   Cardiovascular:      Rate and Rhythm: Normal rate.   Pulmonary:      Effort: Pulmonary effort is normal. No respiratory distress.   Abdominal:      General: Abdomen is flat.   Musculoskeletal:      Cervical back: Normal, normal range of motion and neck supple.      Thoracic back: Normal.      Lumbar back: Tenderness and bony tenderness present. No swelling or deformity.      Comments: Bony tenderness over the lumbar spine, as well as diffuse right-sided lumbar paraspinal tenderness.  No significant lower extremity weakness.  Gait is reportedly affected due to the pain within the leg and low back.   Skin:     General: Skin is warm and dry.      Findings: No rash.   Neurological:      Mental Status: She is alert and oriented to person, place, and time.         ED Course                 Procedures           Results for orders placed or performed during the hospital encounter of 07/18/23 (from the past 24 hour(s))   Lumbar spine XR, 2-3 views    Narrative    EXAM: XR LUMBAR SPINE 2/3 VIEWS  LOCATION: [facility]  DATE: 7/18/2023    INDICATION: acute low back pain  COMPARISON: CT abdomen pelvis 04/15/2020      Impression    IMPRESSION: Osseous structures appear diffusely demineralized. Age indeterminate L1 compression fracture with mild superior endplate depression, new since CT 04/15/2020. Shallow L3 superior endplate concavity may represent additional milder   age-indeterminate compression. Questionable mild anterior wedge configuration of T12, age-indeterminate fracture versus projectional. Other lumbar vertebral body heights are maintained. Mild grade 1 anterolisthesis at L4-L5 and L5-S1 is most likely on a   degenerative basis, also new. Multilevel lower lumbar predominant facet osteoarthritis and with similar  severe disc space at L5-S1. Surgical clips project over the right upper quadrant and right hemipelvis. Vascular calcifications indicate   atherosclerosis.   UA with Microscopic reflex to Culture    Specimen: Urine, Clean Catch   Result Value Ref Range    Color Urine Yellow Colorless, Straw, Light Yellow, Yellow    Appearance Urine Slightly Cloudy (A) Clear    Glucose Urine Negative Negative mg/dL    Bilirubin Urine Negative Negative    Ketones Urine Negative Negative mg/dL    Specific Gravity Urine 1.013 1.003 - 1.035    Blood Urine Moderate (A) Negative    pH Urine 5.0 5.0 - 7.0    Protein Albumin Urine Negative Negative mg/dL    Urobilinogen Urine Normal Normal, 2.0 mg/dL    Nitrite Urine Negative Negative    Leukocyte Esterase Urine Negative Negative    Bacteria Urine Few (A) None Seen /HPF    Mucus Urine Present (A) None Seen /LPF    RBC Urine 6 (H) <=2 /HPF    WBC Urine 6 (H) <=5 /HPF    Squamous Epithelials Urine 7 (H) <=1 /HPF    Narrative    Urine Culture not indicated       Medications - No data to display    Assessments & Plan (with Medical Decision Making)   Patient presented to urgent care for concern of low back pain.  Patient is afebrile on arrival and vital signs are otherwise reassuring at this time.  Patient is noted to have significant right-sided paraspinal tenderness as well as bony tenderness directly over the lumbar spine.  X-ray was performed today which reveals concern for compression fractures in both L1 and L3.  Patient has no known injury and I suspect this is likely related to her osteoporosis.  Patient was able to ambulate from wheelchair to bathroom with assist of 1.  Patient does normally use a walker at home to assist her.  Patient does also have family who she lives with and reports she has several railings throughout the house.  She was provided with a dose of Norco in department and patient reported significantly decreased pain reported that she was much more comfortable on  reassessment.  Patient has no red flag symptoms including saddle anesthesia, loss of bowel or bladder control at this time.  Patient appears stable for discharge at this time with close follow-up with spine specialty.  Consulted with ER physician as well who agrees with this plan of care.  Norco sparingly at home for pain management in addition to ibuprofen and Tylenol.  Did advise her to use Tylenol sparingly as well if she is also using the Norco.  Side effects and risks associated with narcotic use were reviewed with the patient. Urinalysis collected today is not convincing for urinary tract infection at this time.  Did order a culture which is pending.  Low concern for any pyelonephritis or kidney stone at this time.  Return precautions were reviewed with the patient, including prompt return for any loss of bowel or bladder control, sudden weakness, or saddle anesthesia.  She was discharged in good condition and she is agreeable to above plan.    I have reviewed the nursing notes.    I have reviewed the findings, diagnosis, plan and need for follow up with the patient.        Discharge Medication List as of 7/18/2023  5:34 PM          Final diagnoses:   Compression fracture of L1 vertebra, initial encounter (H)   Compression fracture of L3 vertebra, initial encounter (H)       7/18/2023   [facility] EMERGENCY DEPT    Disclaimer:  This note consists of symbols derived from keyboarding, dictation and/or voice recognition software.  As a result, there may be errors in the script that have gone undetected.  Please consider this when interpreting information found in this chart.       Tuan Calderon, SANDEEP CNP  07/18/23 8022

## 2023-07-18 NOTE — DISCHARGE INSTRUCTIONS
See attached documents for home care with compression fractures. Return immediately to the ED if you develop sudden weakness in the leg, loss of bowel or bladder control, or any other new concerns.     Norco for pain meds.  Try to use this sparingly.  This medication can cause many side effects including dizziness, lightheadedness, wooziness and constipation.  Recommend taking stool softeners with this medication due to risk of constipation.

## 2023-07-20 LAB — BACTERIA UR CULT: NORMAL

## 2023-07-21 NOTE — RESULT ENCOUNTER NOTE
Final urine culture report is negative.  Adult Negative Urine culture parameters per protocol: Any # Urogenital single or mixed organism, <10,000 col/ml single organism (cath/midstream), and > 3 organisms (No susceptibilities performed).  [facility] Emergency Dept discharge antibiotic prescribed (If applicable): None  Treatment recommendations per [facility] ED Lab Result Urine Culture protocol.

## 2023-07-23 ENCOUNTER — NURSE TRIAGE (OUTPATIENT)
Dept: NURSING | Facility: CLINIC | Age: 73
End: 2023-07-23
Payer: MEDICARE

## 2023-07-23 ENCOUNTER — TELEPHONE (OUTPATIENT)
Dept: EMERGENCY MEDICINE | Facility: CLINIC | Age: 73
End: 2023-07-23
Payer: MEDICARE

## 2023-07-23 NOTE — TELEPHONE ENCOUNTER
[facility] Emergency Department Lab result notification     Caller/Patient name  Patient - Lisa    Reason for call  Patient requesting lab result    Lab Result  Urine Culture  Order: [id number]   Collected 7/18/2023  4:42 PM      Status: Final result      Visible to patient: No (inaccessible in MyChart)     Specimen Information: Urine, Clean Catch    1 Result Note  Culture 50,000-100,000 CFU/mL Mixture of urogenital lili            Resulting Agency: [facility]           Specimen Collected: 07/18/23  4:42 PM Last Resulted: 07/20/23  5:55 AM             Current symptoms  11:11 AM - patient requesting urine culture results from 7/18    Recommendations/Instructions  Results returned negative, discuss mixed urogenital lili patient verbalized understanding and agrees with plan, ends call.    Carlos Hopper RN  [facility]  Emergency Dept Lab Result RN  # [phone]

## 2023-07-23 NOTE — TELEPHONE ENCOUNTER
Patient calling back to get her Urine results from ED visit in [place]. Patient transferred to ED Lab result line.     YOSI TALLEY, DANIEL  [facility] nurse advisors  7/23/2023  11:09 AM  Reason for Disposition   Health Information question, no triage required and triager able to answer question    Additional Information   Negative: [1] Caller is not with the adult (patient) AND [2] reporting urgent symptoms   Negative: Lab result questions   Negative: Medication questions   Negative: Caller can't be reached by phone   Negative: Caller has already spoken to PCP or another triager   Negative: RN needs further essential information from caller in order to complete triage   Negative: Requesting regular office appointment   Negative: [1] Caller requesting NON-URGENT health information AND [2] PCP's office is the best resource    Protocols used: Information Only Call - No Triage-A-

## 2023-07-24 NOTE — TELEPHONE ENCOUNTER
[facility] Emergency Department/Urgent Care Lab result notification  [Note:  ED Lab Results RN will reference the [facility] Emergency Dept visit note prior to contacting patient AND/OR prior to consulting Emergency Dept Provider.  Highlights of Emergency Dept visit in information summary at the bottom of this telephone note]    1. Reason for call    Notify of lab results    Assess patient symptoms [if necessary]    Review ED Providers recommendations/discharge instructions (if necessary)    Advise per [facility] ED lab result protocol      3. RN Assessment (Patient's current Symptoms):    Time of call: 7/24/2023 10:57 AM    Assessment: patient calling again said she has two fractures and is starting with weakness now in arm looking for recommendation    4. RN Recommendations/Instructions per [facility] ED lab result protocol    [facility] ED lab result protocol used: Misc    RN reviewed information about ED results team role with regard to management of discharge labs - we do not address imaging, she is encouraged to return at anytime for worsening symptoms if she is having severe weakness inability to use arm, or any circulation problems numbness/inability to feel arm, cold limb, color changes/blue skin, severe pain - otherwise if she is looking to talk with someone more about her symptoms prior to returning she should speak with clinic such as PCP - she wanted to reach out to her primary care clinic and was agreeable to plan - she then said thank you and hung up/ended call    5. Please Contact your PCP clinic or return to the Emergency department if your:    Symptoms return.    Symptoms worsen or other concerning symptoms.      Carlos Hopper RN  [facility] [employer] [place]  Emergency Dept Lab Result RN  Ph# [phone]

## 2023-07-26 ENCOUNTER — OFFICE VISIT (OUTPATIENT)
Dept: NEUROSURGERY | Facility: CLINIC | Age: 73
End: 2023-07-26
Payer: MEDICARE

## 2023-07-26 ENCOUNTER — HOSPITAL ENCOUNTER (OUTPATIENT)
Dept: MRI IMAGING | Facility: HOSPITAL | Age: 73
Discharge: HOME | End: 2023-07-26
Attending: NURSE PRACTITIONER | Admitting: NURSE PRACTITIONER
Payer: MEDICARE

## 2023-07-26 VITALS — DIASTOLIC BLOOD PRESSURE: 73 MMHG | SYSTOLIC BLOOD PRESSURE: 167 MMHG | OXYGEN SATURATION: 91 % | HEART RATE: 102 BPM

## 2023-07-26 DIAGNOSIS — S32.030A COMPRESSION FRACTURE OF L3 VERTEBRA, INITIAL ENCOUNTER (H): ICD-10-CM

## 2023-07-26 DIAGNOSIS — F17.200 SMOKING: ICD-10-CM

## 2023-07-26 DIAGNOSIS — S32.010A COMPRESSION FRACTURE OF L1 VERTEBRA, INITIAL ENCOUNTER (H): ICD-10-CM

## 2023-07-26 DIAGNOSIS — M54.16 LUMBAR RADICULOPATHY: Primary | ICD-10-CM

## 2023-07-26 DIAGNOSIS — M54.16 LUMBAR RADICULOPATHY: ICD-10-CM

## 2023-07-26 PROCEDURE — G1010 CDSM STANSON: HCPCS

## 2023-07-26 PROCEDURE — 99204 OFFICE O/P NEW MOD 45 MIN: CPT | Performed by: NURSE PRACTITIONER

## 2023-07-26 RX ORDER — ACETAMINOPHEN AND CODEINE PHOSPHATE 300; 30 MG/1; MG/1
1 TABLET ORAL EVERY 6 HOURS PRN
Qty: 12 TABLET | Refills: 0 | Status: SHIPPED | OUTPATIENT
Start: 2023-07-26

## 2023-07-26 ASSESSMENT — PAIN SCALES - GENERAL: PAINLEVEL: EXTREME PAIN (9)

## 2023-07-26 NOTE — PROGRESS NOTES
Neurosurgery clinic note:      A/P:  Paula is a 72yo patient hx osteoporosis who just resumed taking calcium and vit D who is here to establish care after being seen in Urgent Care [place] on 7/18/23 for 3 days of severe right lower back pain which started after a twisting injury. Pain radiated into the right hip and into the right leg. Lumbar XR demonstrated age-indeterminate T12, L1, L3 compression fractures. She was referred to the spine center for further evaluation.    She reports severe lower back pain across her lower back radiating into her right hip and gluteal region, and posteriorly down her right leg into the right calf.  Symptoms began in the last 2 weeks after she twisted. The pain has not significantly improved since she was seen in the urgent care. Prior to symptoms, she does not use any assistive device.  Now she is using a walker to ambulate. She describes the pain in her right leg as cramping.  It comes and goes.  Worse with bending and walking and standing.  It comes on immediately with weightbearing.  She did have some tingling in her right calf, but this has resolved both legs feel weak due to pain, but not in any specific area. She denies any change in bowel or bladder control or saddle anesthesia.  She reports months ago having a flare of left hip pain into her left lateral thigh which resolved.  She had x-rays at the time which were negative for fracture.  She is here today with her daughter. They are from [place].      We talked about the results of her x-rays.  Her compression fractures are age-indeterminate.  She does not have any tenderness over these areas on her exam.  Her tenderness is midline lower down near what seems like L5-S1. She does have significant disc height loss at this level on her x-rays.  She recalls having severe diffuse thoracolumbar pain prior to Christmas in 2022 without injury at that time.  She saw her primary care several times regarding the pain, no imaging  was advised.  The pain ultimately slowly got better and returned to a baseline low level of dull low back soreness with activity.  I do wonder if at that time, she may have sustained one or more of these fractures.  Recommended MRI lumbar spine for age determination of fractures, since results would change my management of her problem significantly.     If fractures are acute, could add a brace, activity restrictions, and would follow the fractures with subsequent x-rays during healing.  If fractures are chronic, would not advise any particular treatment for these at this time and would likely refer for a course of physical therapy.  Could consider course of p.o. steroids for pain control, or epidural steroid injections to the lumbar spine at L5-S1 if for example a significant disc herniation or nerve impingement on the right was seen at this level which would correspond with her right posterior leg pain.  For now, until results are available, gave her a small number of Tylenol 3 to use as needed, with future refills to come from her primary care if they feel this is indicated.  She will stop taking Norco and any additional Tylenol-containing medications.  We will review her MRI results by phone and decide plan      Exam:    General: seated in NAD, appears comfortable    Neuro: PERRL, EOMs intact without nystagmus, face is symmetrical, tongue is midline, hearing intact bilaterally, shoulder shrug is strong and equal    Strength:  Hip flexors: 5/5 right, 5/5 left  Quads: 5/5 right, 5/5 left  Hamstrings: 5/5 right, 5/5 left  Dorsiflexion: 5/5 right, 5/5 left  Plantarflexion 5/5 right, 5/5 left  EHL: 5/5 right, 5/5 left    Sensation is intact to light touch throughout lower extremities bilaterally    Reflexes:  1+ bilaterally throughout the upper and lower extremities    No clonus, babinski    Gait was deferred due to patient's severe pain, she is in a wheelchair    Positive lower lumbar point tenderness  No paraspinal  musculature tenderness    Positive bilateral hip joint tenderness    No significant SI joint tenderness.    Positive straight leg raise on right, negative on left    Imaging:  Personally reviewed the following imaging studies today. Imaging also shown to patient at time of appt.    EXAM: XR LUMBAR SPINE 2/3 VIEWS  LOCATION: [facility]  DATE: 7/18/2023     INDICATION: acute low back pain  COMPARISON: CT abdomen pelvis 04/15/2020                                                                      IMPRESSION: Osseous structures appear diffusely demineralized. Age indeterminate L1 compression fracture with mild superior endplate depression, new since CT 04/15/2020. Shallow L3 superior endplate concavity may represent additional milder   age-indeterminate compression. Questionable mild anterior wedge configuration of T12, age-indeterminate fracture versus projectional. Other lumbar vertebral body heights are maintained. Mild grade 1 anterolisthesis at L4-L5 and L5-S1 is most likely on a   degenerative basis, also new. Multilevel lower lumbar predominant facet osteoarthritis and with similar severe disc space at L5-S1. Surgical clips project over the right upper quadrant and right hemipelvis. Vascular calcifications indicate   atherosclerosis.        Anaya Pascal FNP-C  [facility] Neurosurgery  O. [phone]

## 2023-07-26 NOTE — PATIENT INSTRUCTIONS
Your T12, L1, L3 compression fractures on your Xrays are age-indeterminate. Recommend MRI to see if they are old or new prior to further management. We will discuss results by phone and decide plan.     If fractures are new, we could add a brace for you to wear whenever you are upright or out of bed, would recommend activity restrictions, and would recommend repeating Xrays as the areas heal to make sure they are healing nicely.     If fractures are old, then it is more likely that your back and leg pain is coming from another source. If your MRI shows a disc herniation or significant narrowing around the nerves in your spine between L5-S1 (for example), we could consider doing other medications such as steroids, or a steroid injection for pain control.     A lot of your treatment plan will depend on the results of your MRI. Please call our office to review results and plan.    Anaya AZEVEDO  [facility] Neurosurgery  O. [phone]

## 2023-07-26 NOTE — LETTER
7/26/2023         RE: Paula Simms  [address] [facility] [address]        Dear Colleague,    Thank you for referring your patient, Paula Simms, to the [facility]. Please see a copy of my visit note below.    Neurosurgery clinic note:      A/P:  Paula is a 72yo patient hx osteoporosis who just resumed taking calcium and vit D who is here to establish care after being seen in [facility] [place] on 7/18/23 for 3 days of severe right lower back pain which started after a twisting injury. Pain radiated into the right hip and into the right leg. Lumbar XR demonstrated age-indeterminate T12, L1, L3 compression fractures. She was referred to the spine center for further evaluation.    She reports severe lower back pain across her lower back radiating into her right hip and gluteal region, and posteriorly down her right leg into the right calf.  Symptoms began in the last 2 weeks after she twisted. The pain has not significantly improved since she was seen in the urgent care. Prior to symptoms, she does not use any assistive device.  Now she is using a walker to ambulate. She describes the pain in her right leg as cramping.  It comes and goes.  Worse with bending and walking and standing.  It comes on immediately with weightbearing.  She did have some tingling in her right calf, but this has resolved both legs feel weak due to pain, but not in any specific area. She denies any change in bowel or bladder control or saddle anesthesia.  She reports months ago having a flare of left hip pain into her left lateral thigh which resolved.  She had x-rays at the time which were negative for fracture.  She is here today with her daughter. They are from [place].      We talked about the results of her x-rays.  Her compression fractures are age-indeterminate.  She does not have any tenderness over these areas on her exam.  Her tenderness is midline lower down near what seems like L5-S1.  She does have significant disc height loss at this level on her x-rays.  She recalls having severe diffuse thoracolumbar pain prior to Christmas in 2022 without injury at that time.  She saw her primary care several times regarding the pain, no imaging was advised.  The pain ultimately slowly got better and returned to a baseline low level of dull low back soreness with activity.  I do wonder if at that time, she may have sustained one or more of these fractures.  Recommended MRI lumbar spine for age determination of fractures, since results would change my management of her problem significantly.     If fractures are acute, could add a brace, activity restrictions, and would follow the fractures with subsequent x-rays during healing.  If fractures are chronic, would not advise any particular treatment for these at this time and would likely refer for a course of physical therapy.  Could consider course of p.o. steroids for pain control, or epidural steroid injections to the lumbar spine at L5-S1 if for example a significant disc herniation or nerve impingement on the right was seen at this level which would correspond with her right posterior leg pain.  For now, until results are available, gave her a small number of Tylenol 3 to use as needed, with future refills to come from her primary care if they feel this is indicated.  She will stop taking Norco and any additional Tylenol-containing medications.  We will review her MRI results by phone and decide plan      Exam:    General: seated in NAD, appears comfortable    Neuro: PERRL, EOMs intact without nystagmus, face is symmetrical, tongue is midline, hearing intact bilaterally, shoulder shrug is strong and equal    Strength:  Hip flexors: 5/5 right, 5/5 left  Quads: 5/5 right, 5/5 left  Hamstrings: 5/5 right, 5/5 left  Dorsiflexion: 5/5 right, 5/5 left  Plantarflexion 5/5 right, 5/5 left  EHL: 5/5 right, 5/5 left    Sensation is intact to light touch throughout  lower extremities bilaterally    Reflexes:  1+ bilaterally throughout the upper and lower extremities    No clonus, babinski    Gait was deferred due to patient's severe pain, she is in a wheelchair    Positive lower lumbar point tenderness  No paraspinal musculature tenderness    Positive bilateral hip joint tenderness    No significant SI joint tenderness.    Positive straight leg raise on right, negative on left    Imaging:  Personally reviewed the following imaging studies today. Imaging also shown to patient at time of appt.    EXAM: XR LUMBAR SPINE 2/3 VIEWS  LOCATION: [facility]  DATE: 7/18/2023     INDICATION: acute low back pain  COMPARISON: CT abdomen pelvis 04/15/2020                                                                      IMPRESSION: Osseous structures appear diffusely demineralized. Age indeterminate L1 compression fracture with mild superior endplate depression, new since CT 04/15/2020. Shallow L3 superior endplate concavity may represent additional milder   age-indeterminate compression. Questionable mild anterior wedge configuration of T12, age-indeterminate fracture versus projectional. Other lumbar vertebral body heights are maintained. Mild grade 1 anterolisthesis at L4-L5 and L5-S1 is most likely on a   degenerative basis, also new. Multilevel lower lumbar predominant facet osteoarthritis and with similar severe disc space at L5-S1. Surgical clips project over the right upper quadrant and right hemipelvis. Vascular calcifications indicate   atherosclerosis.        Anaya Pascal FNP-C  [facility] Neurosurgery  O. [phone]      Again, thank you for allowing me to participate in the care of your patient.        Sincerely,        SANDEEP Quinones CNP

## 2023-07-26 NOTE — NURSING NOTE
"Paula Simms is a 73 year old female who presents for:  Chief Complaint   Patient presents with    Consult     Compression Fracture L1/L3 - low back and hip pain radiating into legs/calves, numbness in both arms and hands        Initial Vitals:  BP (!) 167/73   Pulse 102   SpO2 91%  Estimated body mass index is 30.18 kg/m  as calculated from the following:    Height as of 9/1/22: 5' 2\" (1.575 m).    Weight as of 9/1/22: 165 lb (74.8 kg).. There is no height or weight on file to calculate BSA. BP completed using cuff size: regular  Extreme Pain (9)    Nursing Comments:       Candace Iverson    "

## 2023-07-28 ENCOUNTER — TELEPHONE (OUTPATIENT)
Dept: NEUROSURGERY | Facility: CLINIC | Age: 73
End: 2023-07-28
Payer: MEDICARE

## 2023-07-28 DIAGNOSIS — M54.16 LUMBAR RADICULOPATHY: Primary | ICD-10-CM

## 2023-07-28 RX ORDER — METHYLPREDNISOLONE 4 MG
TABLET, DOSE PACK ORAL
Qty: 21 TABLET | Refills: 0 | Status: SHIPPED | OUTPATIENT
Start: 2023-07-28

## 2023-07-28 NOTE — TELEPHONE ENCOUNTER
[facility] Call Center    Phone Message    May a detailed message be left on voicemail: yes     Reason for Call: MRI Results on Spine. Please call Patient     Action Taken: [facility]     Travel Screening: Not Applicable

## 2023-07-28 NOTE — TELEPHONE ENCOUNTER
Neurosurgery telephone note    Spoke with Paula and reviewed MRI results  Fractures are chronic. Still recommended osteoporosis workup/treatment with PCP.     She is more likely symptomatic from disc bulge/narrowing around the nerves at L5-S1.  Pain still severe in the back and leg. She has not picked up the pain tylenol #3 yet. Will add a medrol dose pack.    Will have one of our nurses call to check in early next week. If still significant pain and no relief, would likely order a right L5-S1 TF USAMA.    Anaya Pascal FNP-C  [facility] Neurosurgery  O. [phone]

## 2023-07-31 NOTE — TELEPHONE ENCOUNTER
"Called Paula to check in on her current symptoms/concerns. She said she is doing \"better\", she voted to postpone an injection at L5-S1 that was discussed last week. She was asked to keep us updated.  Cherelle Diamond RN, CNRN    "

## 2023-08-07 ENCOUNTER — TELEPHONE (OUTPATIENT)
Dept: NEUROSURGERY | Facility: CLINIC | Age: 73
End: 2023-08-07
Payer: MEDICARE

## 2023-08-07 DIAGNOSIS — M54.16 LUMBAR RADICULOPATHY: Primary | ICD-10-CM

## 2023-08-07 NOTE — TELEPHONE ENCOUNTER
Paula called to schedule an injection, I do not see a referral.  She insist she got a call last week asking her to schedule.

## 2023-08-08 ENCOUNTER — TELEPHONE (OUTPATIENT)
Dept: PALLIATIVE MEDICINE | Facility: OTHER | Age: 73
End: 2023-08-08
Payer: MEDICARE

## 2023-08-08 DIAGNOSIS — M54.16 LUMBAR RADICULOPATHY: Primary | ICD-10-CM

## 2023-08-08 NOTE — TELEPHONE ENCOUNTER
Screening Questions for Radiology Injections:    Injection to be done at which interventional clinic site? [facility]    Procedure ordered by Emy Brantley PA-C    Procedure ordered? Right L5-S1 TFESI    Transforaminal Cervical USAMA - Send to [facility] - No  Community Site providers perform this procedure    What insurance would patient like us to bill for this procedure? Medicare and Medicaid     IF SCHEDULING IN [facility] PLEASE SCHEDULE AT LEAST 7-10 BUSINESS DAYS OUT SO A PA CAN BE OBTAINED    Worker's comp or MVA (motor vehicle accident) -Any injection DO NOT SCHEDULE and route to Indra Hurtado.      [employer] insurance - For SI joint injections, DO NOT SCHEDULE and route to Yeimi Oscar.       ALL BCBS, [employer] and [employer] - DO NOT SCHEDULE and route to Yeimi Dayne    MEDICA- facet joint injections, route to Yeimi Dayne    Is patient scheduled at [facility]? No    If YES, route every encounter to [facility] SPINE CENTER CARE NAVIGATION POOL [[phone]]    Is an  needed? No     Patient has a  home? (Review Grid) YES: Informed     Any chance of pregnancy? NO   If YES, do NOT schedule and route to RN pool  - Dr. Bobo route to Marita Lechuga and PM&R Nurse  [[id number]]      Is patient actively being treated for cancer or immunocompromised? No  If YES, do NOT schedule and route to RN pool/ Dr. Bobo's Team    Does the patient have a bleeding or clotting disorder? No     If YES, okay to schedule AND route to RN nurse karlos/ Dr. Bobo's Team     (For any patients with platelet count <100, RN must forward to provider)    Is patient taking any Blood Thinners OR Antiplatelet medication?  No   If hold needed, do NOT schedule, route to RN pool/ Dr. Bobo's Team    Examples:   o Blood Thinners: (Coumadin, Warfarin, Jantoven, Pradaxa, Xarelto, Eliquis, Edoxaban, Enoxaparin, Lovenox, Heparin, Arixtra, Fondaparinux or Fragmin)  o Antiplatelet Medications: (Plavix, Brilinta  or Effient)     Is patient taking any aspirin products (includes Excedrin and Fiorinal)? Yes - Pt takes 325mg daily; instructed to hold 0 day(s) prior to procedure.      If more than 325mg/day, OK to schedule; Instruct Pt to decrease to less than 325 mg for 7 days AND route to RN pool/ Dr. Bobo's Team     For CERVICAL procedures, hold all aspirin products for 6 days.     Tell Pt that if aspirin product is not held for 6 days, the procedure WILL BE cancelled.     Any allergies to contrast dye, iodine, shellfish, or numbing and steroid medications? No    If YES, schedule and add allergy information to appointment notes AND route to the RN pool/ Dr. Bobo's Team    If USAMA and Contrast Dye / Iodine Allergy? DO NOT SCHEDULE, route to RN pool/ Dr. Bobo's Team    Allergies: Tramadol, Levofloxacin, and Sulfa antibiotics     Does patient have an active infection or treated for one within the past week? No    Is patient currently taking any antibiotics or steroid medications?  No     For patients on chronic, preventative, or prophylactic antibiotics, procedures may be scheduled.     For patients on antibiotics for active or recent infection, schedule 4 days after completed.    For patients on steroid medications, schedule 4 days after completed.     Has the patient had a flu shot or any other vaccinations within the past 7 days? No  If yes, explain that for the vaccine to work best they need to:       wait 1 week before and 1 week after getting any Vaccine    wait 1 week before and 2 weeks after getting Covid Vaccine #2 or BOOSTER    If patient has concerns about the timing, send to RN pool/ Dr. Bobo's Team    Does patient have an MRI/CT?  YES: 07/26/23 Include Date and Check Procedure Scheduling Grid to see if required.    Was the MRI/CT done within the last 3 years?  Yes     If no route to RN Pool/ Dr. Bobo's Team    If yes, where was the MRI/CT done? [facility]      Refer to PACS Transmissions list for approved  external locations and route to RN Pool High Priority/ Dr. Bobo's Team    If MRI was not done at approved external location do NOT schedule and route to RN pool/ Dr. Bobo's Team      If patient has an imaging disc, the injection MAY be scheduled but patient must bring disc to appt or appt will be cancelled.    Is patient able to transfer to a procedure table with minimal or no assistance? Yes     If no, do NOT schedule and route to RN Pool/ Dr. Bobo's Team    Procedure Specific Instructions:    If celiac plexus block, informed patient NPO for 6 hours and that it is okay to take medications with sips of water, especially blood pressure medications Not Applicable         If this is for a cervical procedure, informed patient that aspirin needs to be held for 6 days.   Not Applicable      Sedation, If Sedation is ordered for any procedure, patient must be NPO for 6 hours prior to procedure Not Applicable      If IV needed:    Do not schedule procedures requiring IV placement in the first appointment of the day or first appointment after lunch. Do NOT schedule at 0745, 0815 or 1245.       Instructed patient to arrive 30 minutes early for IV start if required. (Check Procedure Scheduling Grid)  Not Applicable    Reminders:    If you are started on any steroids or antibiotics between now and your appointment, you must contact us because the procedure may need to be cancelled.  Yes      As a reminder, receiving steroids can decrease your body's ability to fight infection.   Would you still like to move forward with scheduling the injection?  Yes      IV Sedation is not provided for procedures. If oral anti-anxiety medication is needed, the patient should request this from their referring provider.      Instruct patient to arrive as directed prior to the scheduled appointment time:  If IV needed 30 minutes before appointment time       For patients 85 or older we recommend having an adult stay w/ them for the remainder  of the day.       If the patient is Diabetic, remind them to bring their glucometer.      Does the patient have any questions?  NO  Alberta Scott  [facility]

## 2023-08-24 NOTE — CONFIDENTIAL NOTE
Procedure order placed:  Right L5-S1 TFESI    Red flags:  Taking 325 mg ASA daily: no hold necessary

## 2023-08-29 ENCOUNTER — TELEPHONE (OUTPATIENT)
Dept: PALLIATIVE MEDICINE | Facility: OTHER | Age: 73
End: 2023-08-29
Payer: MEDICARE

## 2023-08-29 NOTE — TELEPHONE ENCOUNTER
Preprocedure reminder call Lumbar USAMA    Arrival time 0345pm    Location: [facility] [address] [place] [address]    Do you have a ? yes    If patient doesn't have a  they will need to call and reschedule    Have you had a vaccine in the last 7 days? no    If yes, check with the nurse or provider. The procedure will most likely need to be rescheduled.    Have you had any oral steroids or antibiotics in the last five days? no    If yes check with the nurse or provider. The procedure will most likely need to be rescheduled.    To call and reschedule or talk to the nurse about any questions you may have please dial    [phone]